# Patient Record
Sex: MALE
[De-identification: names, ages, dates, MRNs, and addresses within clinical notes are randomized per-mention and may not be internally consistent; named-entity substitution may affect disease eponyms.]

---

## 2023-03-17 ENCOUNTER — NURSE TRIAGE (OUTPATIENT)
Dept: OTHER | Facility: CLINIC | Age: 28
End: 2023-03-17

## 2023-03-21 ENCOUNTER — NURSE TRIAGE (OUTPATIENT)
Dept: OTHER | Facility: CLINIC | Age: 28
End: 2023-03-21

## 2023-03-21 NOTE — TELEPHONE ENCOUNTER
Location of patient: [place]     Subjective: Caller states discharged from hospital yesterday afternoon, after inpatient stay since 3/17/23. Dx with Type 2 diabetes 1 month ago, started on Insulin while in the hospital. Afraid to give self insulin with pen, had a panic attack when he tried last night ,has not had any insulin since yesterday, states he cannot give to himself and mom not home until later today, has not checked blood sugar since being home. Lantus and short acting insulin pens are prescribed     Current Symptoms: denies    Associated Symptoms: NA    Pain Severity: n/a    Temperature: n/a    What has been tried:     Recommended disposition: Call PCP office now, check blood sugar as directed,call back for new or worsening symptoms    Care advice provided, patient verbalizes understanding; denies any other questions or concerns.     Outcome: Patient/caller agrees to follow-up with PCP     This triage is a result of a call to the [address] [place] [address]    Reason for Disposition   Caller has URGENT medicine question about med that PCP or specialist prescribed and triager unable to answer question     Patient advised to call office now    Protocols used: Medication Question Call-ADULT-[place]

## 2023-03-27 ENCOUNTER — NURSE TRIAGE (OUTPATIENT)
Dept: OTHER | Facility: CLINIC | Age: 28
End: 2023-03-27

## 2023-03-27 NOTE — TELEPHONE ENCOUNTER
Location of patient: [place]    Subjective: Caller states \"I was recently diagnosed with Type 2 DM\"     Current Symptoms:  Diagnosed last month with Type 2 DM with DKA. BS was 76 one hour ago and was and still is feeling shaky. Took his 35 units of Lantus at 0930, which was 1 hour ago. 10 minutes ago he at 29 carbs and 14 grams of sugar and protein. BS is 103 at time of call. Has been checking his urine ketones and there have been none. Associated Symptoms: NA    Pain Severity: 0/10; N/A; none    What has been tried:  Eating sugar, carbs and protein-helping    Recommended disposition: Aracelis Servin 5277 advice provided, patient verbalizes understanding; denies any other questions or concerns. Outcome:  Caller agrees with home care. Caller stated that he was feeling better by the end of the call with triage RN.      This triage is a result of a call to the [address] [place] [address]      Reason for Disposition   Low blood sugar prevention, questions about    Protocols used: Diabetes - Low Blood Sugar-ADULT-[place] [facility]  Carrie Albrecht M.D.  [address]  [place] [address]  Fax: [phone]   Authorization for Release/Disclosure of   Protected Health Information   Name: CORRINNE ALICE CARMIGNANI : 1935 SSN: [id number]   Address: [address] [place] [address]  [place] [address] Phone:    [phone] (home)    I authorize the entity listed below to release/disclose the PHI below to:   [facility]/Carrie Albrecht M.D. and Carrie Albrecht M.D.   Provider or Entity Name:     Address   City, State, Zip   Phone:      Fax:     Reason for request: continuity of care   Information to be released:    [  ] LAST COLONOSCOPY,  including any PATH REPORT and follow-up  [  ] LAST FIT/COLOGUARD RESULT [  ] LAST DEXA  [  ] LAST MAMMOGRAM  [  ] LAST PAP  [  ] LAST LABS [  ] RETINA EXAM REPORT  [  ] IMMUNIZATION RECORDS  [  ] Release all info      [  ] Check here and initial the line next to each item to release ALL health information INCLUDING  _____ Care and treatment for drug and / or alcohol abuse  _____ HIV testing, infection status, or AIDS  _____ Genetic Testing    DATES OF SERVICE OR TIME PERIOD TO BE DISCLOSED: _____________  I understand and acknowledge that:  * This Authorization may be revoked at any time by you in writing, except if your health information has already been used or disclosed.  * Your health information that will be used or disclosed as a result of you signing this authorization could be re-disclosed by the recipient. If this occurs, your re-disclosed health information may no longer be protected by State or Federal laws.  * You may refuse to sign this Authorization. Your refusal will not affect your ability to obtain treatment.  * This Authorization becomes effective upon signing and will  on (date) __________.      If no date is indicated, this Authorization will  one (1) year from the signature date.    Name: Corrinne Alice Carmignani    Signature:   Date:     3/9/2018          PLEASE FAX REQUESTED RECORDS BACK TO: [phone]

## 2023-09-25 ENCOUNTER — NURSE TRIAGE (OUTPATIENT)
Dept: OTHER | Facility: CLINIC | Age: 28
End: 2023-09-25

## 2023-09-25 NOTE — TELEPHONE ENCOUNTER
Location of patient: [place]    Subjective: Caller states \"I have back for years. I went camping and fell on a fire pit. Every since it has been debilitating. I have numbness in my legs and bump on one side. \" Pt hx of chronic back pain. Per pt, prior to fall only had numbness in one leg and no numbness in groin. Current Symptoms:   Back pain   Bump on left middle side of back   Bilateral numbness in both legs with standing   Intermittent numbness in groin     Pain Severity: 9/10; tingling/fever; constant    Temperature: feels hot but has not taken temp     What has been tried: tylenol,      Recommended disposition: Go to ED Now    Care advice provided, patient verbalizes understanding; denies any other questions or concerns.     Outcome: Patient/caller agrees to proceed to [facility] Emergency Department      This triage is a result of a call to the [address] [facility]    Reason for Disposition   Numbness in groin or rectal area (i.e., loss of sensation)    Protocols used: Back Pain-ADULT-
